# Patient Record
Sex: FEMALE | Race: WHITE | NOT HISPANIC OR LATINO | Employment: PART TIME | URBAN - METROPOLITAN AREA
[De-identification: names, ages, dates, MRNs, and addresses within clinical notes are randomized per-mention and may not be internally consistent; named-entity substitution may affect disease eponyms.]

---

## 2020-11-11 ENCOUNTER — TELEPHONE (OUTPATIENT)
Dept: HEMATOLOGY/ONCOLOGY | Facility: CLINIC | Age: 30
End: 2020-11-11

## 2020-11-11 NOTE — TELEPHONE ENCOUNTER
Pt went to [facility] clinic and found to have abnl count.  Was retested 2 weeks later and told to see [facility]. [facility] not taking her medicaid ins.  Pt also said she no longer has [employer].  Now has [employer].  She agreed to get copy of her records to fax to us.  'Asked her to include copy of new insurance card.    ====================  ----- Message from Myles Gaston RN sent at 11/11/2020 10:42 AM CST -----  Regarding: Low WBC    ------------ Message -----------  From: Marjan Kat  Sent: 11/11/2020  10:39 AM CST  To: [facility] Clinical Support Staff  Subject: Requesting an appt                               Pt called to request an appt for low white blood count.  Pt can be reached at [phone].    Pt was referred by Dr. Keegan Kathleen    Thank you!

## 2020-11-16 ENCOUNTER — TELEPHONE (OUTPATIENT)
Dept: HEMATOLOGY/ONCOLOGY | Facility: CLINIC | Age: 30
End: 2020-11-16

## 2020-11-18 ENCOUNTER — LAB VISIT (OUTPATIENT)
Dept: LAB | Facility: OTHER | Age: 30
End: 2020-11-18
Attending: STUDENT IN AN ORGANIZED HEALTH CARE EDUCATION/TRAINING PROGRAM
Payer: MEDICAID

## 2020-11-18 ENCOUNTER — OFFICE VISIT (OUTPATIENT)
Dept: HEMATOLOGY/ONCOLOGY | Facility: CLINIC | Age: 30
End: 2020-11-18
Payer: MEDICAID

## 2020-11-18 VITALS
RESPIRATION RATE: 16 BRPM | SYSTOLIC BLOOD PRESSURE: 119 MMHG | WEIGHT: 142.19 LBS | HEIGHT: 65 IN | OXYGEN SATURATION: 100 % | BODY MASS INDEX: 23.69 KG/M2 | DIASTOLIC BLOOD PRESSURE: 69 MMHG | TEMPERATURE: 99 F | HEART RATE: 72 BPM

## 2020-11-18 DIAGNOSIS — D72.818 OTHER DECREASED WHITE BLOOD CELL (WBC) COUNT: ICD-10-CM

## 2020-11-18 DIAGNOSIS — Z83.2 FAMILY HX-ANEMIA: ICD-10-CM

## 2020-11-18 DIAGNOSIS — D72.818 OTHER DECREASED WHITE BLOOD CELL (WBC) COUNT: Primary | ICD-10-CM

## 2020-11-18 DIAGNOSIS — R79.89 ABNORMAL CBC: ICD-10-CM

## 2020-11-18 PROBLEM — D72.819 LEUKOPENIA: Status: ACTIVE | Noted: 2020-11-18

## 2020-11-18 LAB
ALBUMIN SERPL BCP-MCNC: 4.3 G/DL (ref 3.5–5.2)
ALP SERPL-CCNC: 47 U/L (ref 55–135)
ALT SERPL W/O P-5'-P-CCNC: 13 U/L (ref 10–44)
ANION GAP SERPL CALC-SCNC: 6 MMOL/L (ref 8–16)
APTT BLDCRRT: 28.5 SEC (ref 21–32)
AST SERPL-CCNC: 16 U/L (ref 10–40)
BASOPHILS # BLD AUTO: 0.03 K/UL (ref 0–0.2)
BASOPHILS NFR BLD: 0.6 % (ref 0–1.9)
BILIRUB SERPL-MCNC: 0.5 MG/DL (ref 0.1–1)
BUN SERPL-MCNC: 10 MG/DL (ref 6–20)
CALCIUM SERPL-MCNC: 8.8 MG/DL (ref 8.7–10.5)
CHLORIDE SERPL-SCNC: 105 MMOL/L (ref 95–110)
CO2 SERPL-SCNC: 27 MMOL/L (ref 23–29)
CREAT SERPL-MCNC: 0.8 MG/DL (ref 0.5–1.4)
DIFFERENTIAL METHOD: ABNORMAL
EOSINOPHIL # BLD AUTO: 0.1 K/UL (ref 0–0.5)
EOSINOPHIL NFR BLD: 1 % (ref 0–8)
ERYTHROCYTE [DISTWIDTH] IN BLOOD BY AUTOMATED COUNT: 12.9 % (ref 11.5–14.5)
ERYTHROCYTE [SEDIMENTATION RATE] IN BLOOD: 8 MM/HR (ref 0–20)
EST. GFR  (AFRICAN AMERICAN): >60 ML/MIN/1.73 M^2
EST. GFR  (NON AFRICAN AMERICAN): >60 ML/MIN/1.73 M^2
FERRITIN SERPL-MCNC: 14 NG/ML (ref 20–300)
FIBRINOGEN PPP-MCNC: 238 MG/DL (ref 182–366)
FOLATE SERPL-MCNC: 7.9 NG/ML (ref 4–24)
GLUCOSE SERPL-MCNC: 91 MG/DL (ref 70–110)
HCT VFR BLD AUTO: 37.6 % (ref 37–48.5)
HGB BLD-MCNC: 13.1 G/DL (ref 12–16)
IMM GRANULOCYTES # BLD AUTO: 0.01 K/UL (ref 0–0.04)
IMM GRANULOCYTES NFR BLD AUTO: 0.2 % (ref 0–0.5)
INR PPP: 1 (ref 0.8–1.2)
IRON SERPL-MCNC: 116 UG/DL (ref 30–160)
LDH SERPL L TO P-CCNC: 217 U/L (ref 110–260)
LYMPHOCYTES # BLD AUTO: 2.2 K/UL (ref 1–4.8)
LYMPHOCYTES NFR BLD: 41.8 % (ref 18–48)
MCH RBC QN AUTO: 33.9 PG (ref 27–31)
MCHC RBC AUTO-ENTMCNC: 34.8 G/DL (ref 32–36)
MCV RBC AUTO: 97 FL (ref 82–98)
MONOCYTES # BLD AUTO: 0.3 K/UL (ref 0.3–1)
MONOCYTES NFR BLD: 6.6 % (ref 4–15)
NEUTROPHILS # BLD AUTO: 2.6 K/UL (ref 1.8–7.7)
NEUTROPHILS NFR BLD: 49.8 % (ref 38–73)
NRBC BLD-RTO: 0 /100 WBC
PLATELET # BLD AUTO: 160 K/UL (ref 150–350)
PMV BLD AUTO: 8.7 FL (ref 9.2–12.9)
POTASSIUM SERPL-SCNC: 3.9 MMOL/L (ref 3.5–5.1)
PROT SERPL-MCNC: 6.8 G/DL (ref 6–8.4)
PROTHROMBIN TIME: 10.9 SEC (ref 9–12.5)
RBC # BLD AUTO: 3.86 M/UL (ref 4–5.4)
RETICS/RBC NFR AUTO: 1.8 % (ref 0.5–2.5)
SATURATED IRON: 36 % (ref 20–50)
SODIUM SERPL-SCNC: 138 MMOL/L (ref 136–145)
TOTAL IRON BINDING CAPACITY: 320 UG/DL (ref 250–450)
TRANSFERRIN SERPL-MCNC: 216 MG/DL (ref 200–375)
VIT B12 SERPL-MCNC: 376 PG/ML (ref 210–950)
WBC # BLD AUTO: 5.19 K/UL (ref 3.9–12.7)

## 2020-11-18 PROCEDURE — 80053 COMPREHEN METABOLIC PANEL: CPT

## 2020-11-18 PROCEDURE — 86038 ANTINUCLEAR ANTIBODIES: CPT

## 2020-11-18 PROCEDURE — 85651 RBC SED RATE NONAUTOMATED: CPT

## 2020-11-18 PROCEDURE — 82746 ASSAY OF FOLIC ACID SERUM: CPT

## 2020-11-18 PROCEDURE — 86704 HEP B CORE ANTIBODY TOTAL: CPT

## 2020-11-18 PROCEDURE — 99999 PR PBB SHADOW E&M-EST. PATIENT-LVL III: ICD-10-PCS | Mod: PBBFAC,,, | Performed by: STUDENT IN AN ORGANIZED HEALTH CARE EDUCATION/TRAINING PROGRAM

## 2020-11-18 PROCEDURE — 36415 COLL VENOUS BLD VENIPUNCTURE: CPT

## 2020-11-18 PROCEDURE — 85730 THROMBOPLASTIN TIME PARTIAL: CPT

## 2020-11-18 PROCEDURE — 86706 HEP B SURFACE ANTIBODY: CPT

## 2020-11-18 PROCEDURE — 85384 FIBRINOGEN ACTIVITY: CPT

## 2020-11-18 PROCEDURE — 83615 LACTATE (LD) (LDH) ENZYME: CPT

## 2020-11-18 PROCEDURE — 85025 COMPLETE CBC W/AUTO DIFF WBC: CPT

## 2020-11-18 PROCEDURE — 99213 OFFICE O/P EST LOW 20 MIN: CPT | Mod: PBBFAC | Performed by: STUDENT IN AN ORGANIZED HEALTH CARE EDUCATION/TRAINING PROGRAM

## 2020-11-18 PROCEDURE — 82607 VITAMIN B-12: CPT

## 2020-11-18 PROCEDURE — 86803 HEPATITIS C AB TEST: CPT

## 2020-11-18 PROCEDURE — 84630 ASSAY OF ZINC: CPT

## 2020-11-18 PROCEDURE — 87340 HEPATITIS B SURFACE AG IA: CPT

## 2020-11-18 PROCEDURE — 85045 AUTOMATED RETICULOCYTE COUNT: CPT

## 2020-11-18 PROCEDURE — 82728 ASSAY OF FERRITIN: CPT

## 2020-11-18 PROCEDURE — 86703 HIV-1/HIV-2 1 RESULT ANTBDY: CPT

## 2020-11-18 PROCEDURE — 83540 ASSAY OF IRON: CPT

## 2020-11-18 PROCEDURE — 99205 OFFICE O/P NEW HI 60 MIN: CPT | Mod: S$PBB,,, | Performed by: STUDENT IN AN ORGANIZED HEALTH CARE EDUCATION/TRAINING PROGRAM

## 2020-11-18 PROCEDURE — 82525 ASSAY OF COPPER: CPT

## 2020-11-18 PROCEDURE — 99999 PR PBB SHADOW E&M-EST. PATIENT-LVL III: CPT | Mod: PBBFAC,,, | Performed by: STUDENT IN AN ORGANIZED HEALTH CARE EDUCATION/TRAINING PROGRAM

## 2020-11-18 PROCEDURE — 85610 PROTHROMBIN TIME: CPT

## 2020-11-18 PROCEDURE — 83921 ORGANIC ACID SINGLE QUANT: CPT

## 2020-11-18 PROCEDURE — 99205 PR OFFICE/OUTPT VISIT, NEW, LEVL V, 60-74 MIN: ICD-10-PCS | Mod: S$PBB,,, | Performed by: STUDENT IN AN ORGANIZED HEALTH CARE EDUCATION/TRAINING PROGRAM

## 2020-11-18 NOTE — ASSESSMENT & PLAN NOTE
Leukopenia of 3.6 noted on labs drawn with PCP on annual checkup  Will order workup with hepatitis, B12, LDH, methylmalonic acid, folate, HIV, copper  Patient denies any lumps or bumps or for current unrelenting infections.  She denies any history of blood disorders/leukemia or cancer in family  Will follow-up on the workup  Return to clinic in 6 months

## 2020-11-19 LAB
ANA SER QL IF: NORMAL
HBV CORE AB SERPL QL IA: NEGATIVE
HBV SURFACE AB SER-ACNC: POSITIVE M[IU]/ML
HBV SURFACE AG SERPL QL IA: NEGATIVE
HCV AB SERPL QL IA: NEGATIVE
HIV 1+2 AB+HIV1 P24 AG SERPL QL IA: NEGATIVE

## 2020-11-19 NOTE — PROGRESS NOTES
Hematology- Oncology Clinic Note :      11/18/2020    RFV / chief complaint- Referral (leukopenia)        HPI  Pt is a 30 y.o. female who  has a past medical history of Anxiety.   Pt presents to the clinic today for evaluation of leukopenia.     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   She had labs drawn with her PCP which showed low white count.  Patient was referred to hematology for further workup  Patient reports to be in good health.  She denies any recurrent infections or hospitalizations.  She denies any B symptoms, no weight loss change in appetite or fatigue or night sweats.  No lumps or bumps.    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  Past medical history-anxiety.  Patient denies any family history of blood disorders, leukemias or cancers.  She mentions her mother being anemic.  Patient denies any history of smoking or excessive alcohol use.      Reviewed past medical/surgical/social history    Past Medical History:   Diagnosis Date    Anxiety       Past Surgical History:   Procedure Laterality Date    NO PAST SURGERIES        (Not in a hospital admission)    Review of patient's allergies indicates:  No Known Allergies   Social History     Tobacco Use    Smoking status: Never Smoker   Substance Use Topics    Alcohol use: No      Family History   Problem Relation Age of Onset    Breast cancer Neg Hx     Colon cancer Neg Hx     Ovarian cancer Neg Hx     Diabetes Neg Hx     Hypertension Neg Hx     Stroke Neg Hx           Review of Systems :  Review of Systems   Constitutional: Negative for chills, diaphoresis, fever, malaise/fatigue and weight loss.   HENT: Negative.  Negative for congestion, hearing loss, nosebleeds, sore throat and tinnitus.    Eyes: Negative.  Negative for blurred vision and discharge.   Respiratory: Negative for cough, hemoptysis, sputum production, shortness of breath and wheezing.    Cardiovascular:  "Negative.  Negative for chest pain, palpitations and leg swelling.   Gastrointestinal: Negative.  Negative for abdominal pain, blood in stool, constipation, diarrhea, heartburn, melena, nausea and vomiting.   Genitourinary: Negative.    Musculoskeletal: Negative.  Negative for back pain, falls, joint pain and myalgias.   Skin: Negative.  Negative for itching and rash.   Neurological: Negative.  Negative for dizziness, tingling, sensory change, speech change, focal weakness, seizures, loss of consciousness, weakness and headaches.   Endo/Heme/Allergies: Negative.  Does not bruise/bleed easily.   Psychiatric/Behavioral: Negative.  Negative for depression. The patient is not nervous/anxious and does not have insomnia.                Physical Exam :  /69 (BP Location: Left arm, Patient Position: Sitting, BP Method: Medium (Automatic))   Pulse 72   Temp 98.8 °F (37.1 °C) (Oral)   Resp 16   Ht 5' 5.3" (1.659 m)   Wt 64.5 kg (142 lb 3.2 oz)   LMP 10/18/2020   SpO2 100%   BMI 23.45 kg/m²   Wt Readings from Last 3 Encounters:   11/18/20 64.5 kg (142 lb 3.2 oz)   01/29/16 64 kg (141 lb 3.2 oz)       Body mass index is 23.45 kg/m².      Physical Exam  Vitals signs and nursing note reviewed.   Constitutional:       General: She is not in acute distress.     Appearance: She is well-developed.   HENT:      Head: Normocephalic and atraumatic.      Mouth/Throat:      Pharynx: No oropharyngeal exudate.   Eyes:      General: No scleral icterus.     Conjunctiva/sclera: Conjunctivae normal.   Neck:      Musculoskeletal: Normal range of motion and neck supple.      Thyroid: No thyromegaly.      Trachea: No tracheal deviation.   Cardiovascular:      Rate and Rhythm: Normal rate and regular rhythm.      Heart sounds: Normal heart sounds. No murmur.   Pulmonary:      Effort: Pulmonary effort is normal. No respiratory distress.      Breath sounds: Normal breath sounds. No wheezing or rales.   Abdominal:      General: Bowel " sounds are normal. There is no distension.      Palpations: Abdomen is soft. There is no hepatomegaly, splenomegaly or mass.      Tenderness: There is no abdominal tenderness. There is no rebound.   Musculoskeletal: Normal range of motion.         General: No tenderness.   Lymphadenopathy:      Cervical: No cervical adenopathy.   Skin:     General: Skin is warm.      Findings: No erythema or rash.   Neurological:      Mental Status: She is alert and oriented to person, place, and time.      Cranial Nerves: No cranial nerve deficit.   Psychiatric:         Behavior: Behavior normal.             Current Outpatient Medications   Medication Sig Dispense Refill    duloxetine (CYMBALTA) 20 MG capsule Take 20 mg by mouth once daily.  4     No current facility-administered medications for this visit.        Pertinent Diagnostic studies:      Lab Visit on 11/18/2020   Component Date Value Ref Range Status    WBC 11/18/2020 5.19  3.90 - 12.70 K/uL Final    RBC 11/18/2020 3.86* 4.00 - 5.40 M/uL Final    Hemoglobin 11/18/2020 13.1  12.0 - 16.0 g/dL Final    Hematocrit 11/18/2020 37.6  37.0 - 48.5 % Final    MCV 11/18/2020 97  82 - 98 fL Final    MCH 11/18/2020 33.9* 27.0 - 31.0 pg Final    MCHC 11/18/2020 34.8  32.0 - 36.0 g/dL Final    RDW 11/18/2020 12.9  11.5 - 14.5 % Final    Platelets 11/18/2020 160  150 - 350 K/uL Final    MPV 11/18/2020 8.7* 9.2 - 12.9 fL Final    Immature Granulocytes 11/18/2020 0.2  0.0 - 0.5 % Final    Gran # (ANC) 11/18/2020 2.6  1.8 - 7.7 K/uL Final    Immature Grans (Abs) 11/18/2020 0.01  0.00 - 0.04 K/uL Final    Comment: Mild elevation in immature granulocytes is non specific and   can be seen in a variety of conditions including stress response,   acute inflammation, trauma and pregnancy. Correlation with other   laboratory and clinical findings is essential.      Lymph # 11/18/2020 2.2  1.0 - 4.8 K/uL Final    Mono # 11/18/2020 0.3  0.3 - 1.0 K/uL Final    Eos # 11/18/2020 0.1   0.0 - 0.5 K/uL Final    Baso # 11/18/2020 0.03  0.00 - 0.20 K/uL Final    nRBC 11/18/2020 0  0 /100 WBC Final    Gran % 11/18/2020 49.8  38.0 - 73.0 % Final    Lymph % 11/18/2020 41.8  18.0 - 48.0 % Final    Mono % 11/18/2020 6.6  4.0 - 15.0 % Final    Eosinophil % 11/18/2020 1.0  0.0 - 8.0 % Final    Basophil % 11/18/2020 0.6  0.0 - 1.9 % Final    Differential Method 11/18/2020 Automated   Final    Sodium 11/18/2020 138  136 - 145 mmol/L Final    Potassium 11/18/2020 3.9  3.5 - 5.1 mmol/L Final    Chloride 11/18/2020 105  95 - 110 mmol/L Final    CO2 11/18/2020 27  23 - 29 mmol/L Final    Glucose 11/18/2020 91  70 - 110 mg/dL Final    BUN 11/18/2020 10  6 - 20 mg/dL Final    Creatinine 11/18/2020 0.8  0.5 - 1.4 mg/dL Final    Calcium 11/18/2020 8.8  8.7 - 10.5 mg/dL Final    Total Protein 11/18/2020 6.8  6.0 - 8.4 g/dL Final    Albumin 11/18/2020 4.3  3.5 - 5.2 g/dL Final    Total Bilirubin 11/18/2020 0.5  0.1 - 1.0 mg/dL Final    Comment: For infants and newborns, interpretation of results should be based  on gestational age, weight and in agreement with clinical  observations.  Premature Infant recommended reference ranges:  Up to 24 hours.............<8.0 mg/dL  Up to 48 hours............<12.0 mg/dL  3-5 days..................<15.0 mg/dL  6-29 days.................<15.0 mg/dL      Alkaline Phosphatase 11/18/2020 47* 55 - 135 U/L Final    AST 11/18/2020 16  10 - 40 U/L Final    ALT 11/18/2020 13  10 - 44 U/L Final    Anion Gap 11/18/2020 6* 8 - 16 mmol/L Final    eGFR if African American 11/18/2020 >60  >60 mL/min/1.73 m^2 Final    eGFR if non African American 11/18/2020 >60  >60 mL/min/1.73 m^2 Final    Comment: Calculation used to obtain the estimated glomerular filtration  rate (eGFR) is the CKD-EPI equation.       Iron 11/18/2020 116  30 - 160 ug/dL Final    Transferrin 11/18/2020 216  200 - 375 mg/dL Final    TIBC 11/18/2020 320  250 - 450 ug/dL Final    Saturated Iron 11/18/2020  36  20 - 50 % Final    Ferritin 11/18/2020 14* 20.0 - 300.0 ng/mL Final    Vitamin B-12 11/18/2020 376  210 - 950 pg/mL Final    Folate 11/18/2020 7.9  4.0 - 24.0 ng/mL Final    Sed Rate 11/18/2020 8  0 - 20 mm/Hr Final    LD 11/18/2020 217  110 - 260 U/L Final    Results are increased in hemolyzed samples.    Retic 11/18/2020 1.8  0.5 - 2.5 % Final    Fibrinogen 11/18/2020 238  182 - 366 mg/dL Final    Prothrombin Time 11/18/2020 10.9  9.0 - 12.5 sec Final    INR 11/18/2020 1.0  0.8 - 1.2 Final    Comment: Coumadin Therapy:  2.0 - 3.0 for INR for all indicators except mechanical heart valves  and antiphospholipid syndromes which should use 2.5 - 3.5.      aPTT 11/18/2020 28.5  21.0 - 32.0 sec Final    aPTT therapeutic range = 39-69 seconds         Patient Active Problem List    Diagnosis Date Noted    Leukopenia 11/18/2020    Abnormal CBC 11/18/2020    Family hx-anemia 11/18/2020     Active Problem List with Overview Notes    Diagnosis Date Noted    Leukopenia 11/18/2020    Abnormal CBC 11/18/2020    Family hx-anemia 11/18/2020         Oncology History    No history exists.     Assessment :       1. Other decreased white blood cell (WBC) count    2. Abnormal CBC    3. Family hx-anemia        Plan :       Leukopenia  Leukopenia of 3.6 noted on labs drawn with PCP on annual checkup  Will order workup with hepatitis, B12, LDH, methylmalonic acid, folate, HIV, copper  Patient denies any lumps or bumps or for current unrelenting infections.  She denies any history of blood disorders/leukemia or cancer in family  Will follow-up on the workup  Return to clinic in 6 months      Problem List Items Addressed This Visit     Leukopenia - Primary    Current Assessment & Plan     Leukopenia of 3.6 noted on labs drawn with PCP on annual checkup  Will order workup with hepatitis, B12, LDH, methylmalonic acid, folate, HIV, copper  Patient denies any lumps or bumps or for current unrelenting infections.  She denies  any history of blood disorders/leukemia or cancer in family  Will follow-up on the workup  Return to clinic in 6 months         Relevant Orders    CBC Auto Differential (Completed)    Comprehensive Metabolic Panel (Completed)    Iron and TIBC (Completed)    Ferritin (Completed)    Vitamin B12 (Completed)    Methylmalonic Acid, Serum    Folate (Completed)    Sedimentation rate (Completed)    Lactate Dehydrogenase (Completed)    Reticulocytes (Completed)    HIV 1/2 Ag/Ab (4th Gen)    Hepatitis C Antibody    Hepatitis B Core Antibody, Total    Hepatitis B Surface Ab, Qualitative    Hepatitis B Surface Antigen    Fibrinogen (Completed)    Protime-INR (Completed)    APTT (Completed)    DEL SCREEN/PROFILE    COPPER, SERUM    ZINC    Abnormal CBC    Family hx-anemia          I spent 60 minutes with the patient with at least 50% of the time on face-to-face counseling or coordination of care.     Electronically signed by Ct Rocha          No future appointments.

## 2020-11-23 ENCOUNTER — PATIENT MESSAGE (OUTPATIENT)
Dept: HEMATOLOGY/ONCOLOGY | Facility: CLINIC | Age: 30
End: 2020-11-23

## 2020-11-23 LAB
COPPER SERPL-MCNC: 883 UG/L (ref 810–1990)
ZINC SERPL-MCNC: 65 UG/DL (ref 60–130)

## 2020-11-24 LAB — METHYLMALONATE SERPL-SCNC: 0.23 UMOL/L

## 2020-11-25 ENCOUNTER — PATIENT MESSAGE (OUTPATIENT)
Dept: HEMATOLOGY/ONCOLOGY | Facility: CLINIC | Age: 30
End: 2020-11-25

## 2021-04-16 ENCOUNTER — PATIENT MESSAGE (OUTPATIENT)
Dept: RESEARCH | Facility: HOSPITAL | Age: 31
End: 2021-04-16

## 2024-11-14 ENCOUNTER — TELEPHONE (OUTPATIENT)
Dept: OPTOMETRY | Facility: CLINIC | Age: 34
End: 2024-11-14
Payer: COMMERCIAL

## 2024-11-25 ENCOUNTER — TELEPHONE (OUTPATIENT)
Dept: OPTOMETRY | Facility: CLINIC | Age: 34
End: 2024-11-25
Payer: COMMERCIAL

## 2025-07-15 ENCOUNTER — OFFICE VISIT (OUTPATIENT)
Dept: PRIMARY CARE CLINIC | Facility: CLINIC | Age: 35
End: 2025-07-15
Payer: COMMERCIAL

## 2025-07-15 ENCOUNTER — LAB VISIT (OUTPATIENT)
Dept: LAB | Facility: HOSPITAL | Age: 35
End: 2025-07-15
Attending: STUDENT IN AN ORGANIZED HEALTH CARE EDUCATION/TRAINING PROGRAM
Payer: COMMERCIAL

## 2025-07-15 VITALS
DIASTOLIC BLOOD PRESSURE: 62 MMHG | OXYGEN SATURATION: 98 % | HEIGHT: 60 IN | SYSTOLIC BLOOD PRESSURE: 124 MMHG | WEIGHT: 140 LBS | BODY MASS INDEX: 27.48 KG/M2 | HEART RATE: 68 BPM

## 2025-07-15 DIAGNOSIS — Z00.00 ANNUAL PHYSICAL EXAM: ICD-10-CM

## 2025-07-15 DIAGNOSIS — R23.3 EASY BRUISING: ICD-10-CM

## 2025-07-15 DIAGNOSIS — F41.9 ANXIETY: ICD-10-CM

## 2025-07-15 DIAGNOSIS — Z00.00 ANNUAL PHYSICAL EXAM: Primary | ICD-10-CM

## 2025-07-15 DIAGNOSIS — Z12.4 CERVICAL CANCER SCREENING: ICD-10-CM

## 2025-07-15 LAB
ABSOLUTE EOSINOPHIL (OHS): 0.11 K/UL
ABSOLUTE MONOCYTE (OHS): 0.3 K/UL (ref 0.3–1)
ABSOLUTE NEUTROPHIL COUNT (OHS): 1.68 K/UL (ref 1.8–7.7)
ALBUMIN SERPL BCP-MCNC: 4.2 G/DL (ref 3.5–5.2)
ALP SERPL-CCNC: 41 UNIT/L (ref 40–150)
ALT SERPL W/O P-5'-P-CCNC: 12 UNIT/L (ref 10–44)
ANION GAP (OHS): 7 MMOL/L (ref 8–16)
APTT PPP: 27.5 SECONDS (ref 21–32)
AST SERPL-CCNC: 14 UNIT/L (ref 11–45)
BASOPHILS # BLD AUTO: 0.05 K/UL
BASOPHILS NFR BLD AUTO: 1.2 %
BILIRUB SERPL-MCNC: 0.4 MG/DL (ref 0.1–1)
BUN SERPL-MCNC: 10 MG/DL (ref 6–20)
CALCIUM SERPL-MCNC: 9 MG/DL (ref 8.7–10.5)
CHLORIDE SERPL-SCNC: 107 MMOL/L (ref 95–110)
CHOLEST SERPL-MCNC: 198 MG/DL (ref 120–199)
CHOLEST/HDLC SERPL: 3.4 {RATIO} (ref 2–5)
CO2 SERPL-SCNC: 25 MMOL/L (ref 23–29)
CREAT SERPL-MCNC: 0.7 MG/DL (ref 0.5–1.4)
EAG (OHS): 94 MG/DL (ref 68–131)
ERYTHROCYTE [DISTWIDTH] IN BLOOD BY AUTOMATED COUNT: 13.6 % (ref 11.5–14.5)
GFR SERPLBLD CREATININE-BSD FMLA CKD-EPI: >60 ML/MIN/1.73/M2
GLUCOSE SERPL-MCNC: 83 MG/DL (ref 70–110)
HBA1C MFR BLD: 4.9 % (ref 4–5.6)
HCT VFR BLD AUTO: 40.2 % (ref 37–48.5)
HDLC SERPL-MCNC: 58 MG/DL (ref 40–75)
HDLC SERPL: 29.3 % (ref 20–50)
HGB BLD-MCNC: 13.5 GM/DL (ref 12–16)
IMM GRANULOCYTES # BLD AUTO: 0 K/UL (ref 0–0.04)
IMM GRANULOCYTES NFR BLD AUTO: 0 % (ref 0–0.5)
INR PPP: 1 (ref 0.8–1.2)
LDLC SERPL CALC-MCNC: 125.2 MG/DL (ref 63–159)
LYMPHOCYTES # BLD AUTO: 2.17 K/UL (ref 1–4.8)
MCH RBC QN AUTO: 32.1 PG (ref 27–31)
MCHC RBC AUTO-ENTMCNC: 33.6 G/DL (ref 32–36)
MCV RBC AUTO: 96 FL (ref 82–98)
NONHDLC SERPL-MCNC: 140 MG/DL
NUCLEATED RBC (/100WBC) (OHS): 0 /100 WBC
PLATELET # BLD AUTO: 190 K/UL (ref 150–450)
PMV BLD AUTO: 10.1 FL (ref 9.2–12.9)
POTASSIUM SERPL-SCNC: 5.1 MMOL/L (ref 3.5–5.1)
PROT SERPL-MCNC: 6.5 GM/DL (ref 6–8.4)
PROTHROMBIN TIME: 11.1 SECONDS (ref 9–12.5)
RBC # BLD AUTO: 4.21 M/UL (ref 4–5.4)
RELATIVE EOSINOPHIL (OHS): 2.6 %
RELATIVE LYMPHOCYTE (OHS): 50.3 % (ref 18–48)
RELATIVE MONOCYTE (OHS): 7 % (ref 4–15)
RELATIVE NEUTROPHIL (OHS): 38.9 % (ref 38–73)
SODIUM SERPL-SCNC: 139 MMOL/L (ref 136–145)
TRIGL SERPL-MCNC: 74 MG/DL (ref 30–150)
TSH SERPL-ACNC: 2.02 UIU/ML (ref 0.4–4)
WBC # BLD AUTO: 4.31 K/UL (ref 3.9–12.7)

## 2025-07-15 PROCEDURE — 85730 THROMBOPLASTIN TIME PARTIAL: CPT

## 2025-07-15 PROCEDURE — 3044F HG A1C LEVEL LT 7.0%: CPT | Mod: CPTII,S$GLB,, | Performed by: STUDENT IN AN ORGANIZED HEALTH CARE EDUCATION/TRAINING PROGRAM

## 2025-07-15 PROCEDURE — 3008F BODY MASS INDEX DOCD: CPT | Mod: CPTII,S$GLB,, | Performed by: STUDENT IN AN ORGANIZED HEALTH CARE EDUCATION/TRAINING PROGRAM

## 2025-07-15 PROCEDURE — 84443 ASSAY THYROID STIM HORMONE: CPT

## 2025-07-15 PROCEDURE — 85025 COMPLETE CBC W/AUTO DIFF WBC: CPT

## 2025-07-15 PROCEDURE — 3078F DIAST BP <80 MM HG: CPT | Mod: CPTII,S$GLB,, | Performed by: STUDENT IN AN ORGANIZED HEALTH CARE EDUCATION/TRAINING PROGRAM

## 2025-07-15 PROCEDURE — 1160F RVW MEDS BY RX/DR IN RCRD: CPT | Mod: CPTII,S$GLB,, | Performed by: STUDENT IN AN ORGANIZED HEALTH CARE EDUCATION/TRAINING PROGRAM

## 2025-07-15 PROCEDURE — 84075 ASSAY ALKALINE PHOSPHATASE: CPT

## 2025-07-15 PROCEDURE — 1159F MED LIST DOCD IN RCRD: CPT | Mod: CPTII,S$GLB,, | Performed by: STUDENT IN AN ORGANIZED HEALTH CARE EDUCATION/TRAINING PROGRAM

## 2025-07-15 PROCEDURE — 99999 PR PBB SHADOW E&M-EST. PATIENT-LVL IV: CPT | Mod: PBBFAC,,, | Performed by: STUDENT IN AN ORGANIZED HEALTH CARE EDUCATION/TRAINING PROGRAM

## 2025-07-15 PROCEDURE — 82465 ASSAY BLD/SERUM CHOLESTEROL: CPT

## 2025-07-15 PROCEDURE — 3074F SYST BP LT 130 MM HG: CPT | Mod: CPTII,S$GLB,, | Performed by: STUDENT IN AN ORGANIZED HEALTH CARE EDUCATION/TRAINING PROGRAM

## 2025-07-15 PROCEDURE — 99385 PREV VISIT NEW AGE 18-39: CPT | Mod: S$GLB,,, | Performed by: STUDENT IN AN ORGANIZED HEALTH CARE EDUCATION/TRAINING PROGRAM

## 2025-07-15 PROCEDURE — 36415 COLL VENOUS BLD VENIPUNCTURE: CPT | Mod: PN

## 2025-07-15 PROCEDURE — 83036 HEMOGLOBIN GLYCOSYLATED A1C: CPT

## 2025-07-15 PROCEDURE — 85610 PROTHROMBIN TIME: CPT

## 2025-07-15 RX ORDER — DEXTROAMPHETAMINE SULFATE 10 MG/1
10 TABLET ORAL DAILY PRN
COMMUNITY
Start: 2025-04-14

## 2025-07-15 RX ORDER — DEXTROAMPHETAMINE SACCHARATE, AMPHETAMINE ASPARTATE, DEXTROAMPHETAMINE SULFATE AND AMPHETAMINE SULFATE 2.5; 2.5; 2.5; 2.5 MG/1; MG/1; MG/1; MG/1
TABLET ORAL
COMMUNITY
Start: 2017-01-01

## 2025-07-15 NOTE — PROGRESS NOTES
Office visit  Patient: Cris Kim   7/15/2025       Assessment/Plan:            1. Annual physical exam  -     TSH; Future; Expected date: 07/15/2025  -     Hemoglobin A1C; Future; Expected date: 07/15/2025  -     Lipid Panel; Future; Expected date: 07/15/2025  -     Comprehensive Metabolic Panel; Future; Expected date: 07/15/2025  -     CBC Auto Differential; Future; Expected date: 07/15/2025        -Discussed healthy habits and recommended preventative screening    2. Anxiety       -stable, continue current medication       -follow up with psych    3. Easy bruising  -     Protime-INR; Future; Expected date: 07/15/2025  -     APTT; Future; Expected date: 07/15/2025    4. Cervical cancer screening  -     Ambulatory referral/consult to Obstetrics / Gynecology; Future; Expected date: 07/22/2025        Return to clinic in 1 year or sooner as needed.          CHIEF COMPLAINT: annual physical    HPI: Cris Kim is a 34 y.o. female who presents for an annual physical. She reports easy bruising.    She sees Awilda Ladd for anxiety - at [facility].        Review of Systems   HENT:  Negative for hearing loss.    Eyes:  Negative for discharge.   Respiratory:  Negative for wheezing.    Cardiovascular:  Negative for chest pain and palpitations.   Gastrointestinal:  Negative for blood in stool, constipation, diarrhea and vomiting.   Genitourinary:  Negative for dysuria and hematuria.   Musculoskeletal:  Negative for neck pain.   Neurological:  Positive for headaches. Negative for weakness.   Endo/Heme/Allergies:  Negative for polydipsia.     Answers submitted by the patient for this visit:  Review of Systems Questionnaire (Submitted on 7/9/2025)  activity change: No  unexpected weight change: No  rhinorrhea: No  trouble swallowing: No  visual disturbance: No  chest tightness: No  polyuria: No  difficulty urinating: No  menstrual problem: No  joint swelling: No  arthralgias: No  confusion: No  dysphoric  "mood: No      Current Outpatient Medications   Medication Instructions    dextroamphetamine sulfate (DEXTROSTAT) 10 mg, Daily PRN    dextroamphetamine-amphetamine (ADDERALL) 10 mg Tab     DULoxetine (CYMBALTA) 20 mg, Daily       Lab Results   Component Value Date    HGBA1C 4.9 07/15/2025     No results found for: "MICALBCREAT"  Lab Results   Component Value Date    LDLCALC 125.2 07/15/2025    CHOL 198 07/15/2025    HDL 58 07/15/2025    TRIG 74 07/15/2025       Lab Results   Component Value Date     07/15/2025    K 5.1 07/15/2025     07/15/2025    CO2 25 07/15/2025    GLU 83 07/15/2025    BUN 10 07/15/2025    CREATININE 0.7 07/15/2025    CALCIUM 9.0 07/15/2025    PROT 6.5 07/15/2025    ALBUMIN 4.2 07/15/2025    BILITOT 0.4 07/15/2025    ALKPHOS 41 07/15/2025    AST 14 07/15/2025    ALT 12 07/15/2025    ANIONGAP 7 (L) 07/15/2025    ESTGFRAFRICA >60 11/18/2020    EGFRNONAA >60 11/18/2020    WBC 4.31 07/15/2025    HGB 13.5 07/15/2025    HGB 13.1 11/18/2020    HCT 40.2 07/15/2025    MCV 96 07/15/2025     07/15/2025    TSH 2.024 07/15/2025    HEPCAB Negative 11/18/2020       Lab Results   Component Value Date    QZMOSRCX67 376 11/18/2020    FERRITIN 14 (L) 11/18/2020    IRON 116 11/18/2020    TRANSFERRIN 216 11/18/2020    TIBC 320 11/18/2020    FESATURATED 36 11/18/2020    ZINC 65 11/18/2020         Past Medical History:   Diagnosis Date    Anxiety      Past Surgical History:   Procedure Laterality Date    NO PAST SURGERIES         Social History[1]    family history is not on file.    Vitals:    07/15/25 0911   BP: 124/62   Pulse: 68   SpO2: 98%   Weight: 63.5 kg (139 lb 15.9 oz)   Height: 5' (1.524 m)   PainSc: 0-No pain       Body mass index is 27.34 kg/m².      Objective:   Physical Exam  Vitals reviewed.   Constitutional:       General: She is not in acute distress.     Appearance: Normal appearance. She is well-developed.   HENT:      Head: Normocephalic and atraumatic.      Right Ear: External " ear normal.      Left Ear: External ear normal.      Nose: Nose normal.      Mouth/Throat:      Mouth: Mucous membranes are moist.   Eyes:      General: No scleral icterus.        Right eye: No discharge.         Left eye: No discharge.      Conjunctiva/sclera: Conjunctivae normal.   Cardiovascular:      Rate and Rhythm: Normal rate and regular rhythm.      Heart sounds: Normal heart sounds. No murmur heard.     No friction rub. No gallop.   Pulmonary:      Effort: Pulmonary effort is normal. No respiratory distress.      Breath sounds: Normal breath sounds. No wheezing, rhonchi or rales.   Abdominal:      General: Abdomen is flat. Bowel sounds are normal. There is no distension.      Palpations: Abdomen is soft. There is no mass.      Tenderness: There is no abdominal tenderness. There is no guarding or rebound.      Hernia: No hernia is present.   Musculoskeletal:         General: No deformity.      Right lower leg: No edema.      Left lower leg: No edema.   Skin:     General: Skin is warm and dry.   Neurological:      General: No focal deficit present.      Mental Status: She is alert and oriented to person, place, and time.   Psychiatric:         Mood and Affect: Mood normal.         Behavior: Behavior normal.         Thought Content: Thought content normal.           Dahiana Stoner MD  Internal Medicine and Pediatrics                                 [1]   Social History  Tobacco Use    Smoking status: Never    Smokeless tobacco: Never   Substance Use Topics    Alcohol use: No    Drug use: No